# Patient Record
Sex: MALE | Race: WHITE | Employment: OTHER | URBAN - METROPOLITAN AREA
[De-identification: names, ages, dates, MRNs, and addresses within clinical notes are randomized per-mention and may not be internally consistent; named-entity substitution may affect disease eponyms.]

---

## 2017-11-01 ENCOUNTER — TELEPHONE (OUTPATIENT)
Dept: INTERNAL MEDICINE | Age: 57
End: 2017-11-01

## 2017-11-01 DIAGNOSIS — Z12.5 SCREENING PSA (PROSTATE SPECIFIC ANTIGEN): ICD-10-CM

## 2017-11-01 DIAGNOSIS — Z00.00 ANNUAL PHYSICAL EXAM: Primary | ICD-10-CM

## 2017-11-21 ENCOUNTER — OFFICE VISIT (OUTPATIENT)
Dept: INTERNAL MEDICINE | Age: 57
End: 2017-11-21

## 2017-11-21 VITALS
DIASTOLIC BLOOD PRESSURE: 78 MMHG | HEART RATE: 68 BPM | SYSTOLIC BLOOD PRESSURE: 126 MMHG | RESPIRATION RATE: 14 BRPM | WEIGHT: 160 LBS | BODY MASS INDEX: 19.89 KG/M2 | HEIGHT: 75 IN

## 2017-11-21 DIAGNOSIS — Z00.00 WELL ADULT EXAM: Primary | ICD-10-CM

## 2017-11-21 DIAGNOSIS — B00.1 RECURRENT COLD SORES: ICD-10-CM

## 2017-11-21 DIAGNOSIS — Z12.5 SCREENING PSA (PROSTATE SPECIFIC ANTIGEN): ICD-10-CM

## 2017-11-21 DIAGNOSIS — Z00.00 ANNUAL PHYSICAL EXAM: ICD-10-CM

## 2017-11-21 LAB
A/G RATIO: 2.2 (ref 1.1–2.2)
ALBUMIN SERPL-MCNC: 4.8 G/DL (ref 3.4–5)
ALP BLD-CCNC: 64 U/L (ref 40–129)
ALT SERPL-CCNC: 13 U/L (ref 10–40)
ANION GAP SERPL CALCULATED.3IONS-SCNC: 13 MMOL/L (ref 3–16)
AST SERPL-CCNC: 22 U/L (ref 15–37)
BASOPHILS ABSOLUTE: 0 K/UL (ref 0–0.2)
BASOPHILS RELATIVE PERCENT: 0.4 %
BILIRUB SERPL-MCNC: 0.5 MG/DL (ref 0–1)
BILIRUBIN URINE: NEGATIVE
BLOOD, URINE: ABNORMAL
BUN BLDV-MCNC: 18 MG/DL (ref 7–20)
CALCIUM SERPL-MCNC: 9.8 MG/DL (ref 8.3–10.6)
CHLORIDE BLD-SCNC: 99 MMOL/L (ref 99–110)
CHOLESTEROL, TOTAL: 207 MG/DL (ref 0–199)
CLARITY: CLEAR
CO2: 28 MMOL/L (ref 21–32)
COLOR: YELLOW
CREAT SERPL-MCNC: 0.8 MG/DL (ref 0.9–1.3)
EOSINOPHILS ABSOLUTE: 0.2 K/UL (ref 0–0.6)
EOSINOPHILS RELATIVE PERCENT: 2.4 %
EPITHELIAL CELLS, UA: 0 /HPF (ref 0–5)
GFR AFRICAN AMERICAN: >60
GFR NON-AFRICAN AMERICAN: >60
GLOBULIN: 2.2 G/DL
GLUCOSE BLD-MCNC: 95 MG/DL (ref 70–99)
GLUCOSE URINE: NEGATIVE MG/DL
HCT VFR BLD CALC: 48.9 % (ref 40.5–52.5)
HDLC SERPL-MCNC: 64 MG/DL (ref 40–60)
HEMOGLOBIN: 16.6 G/DL (ref 13.5–17.5)
HYALINE CASTS: 0 /LPF (ref 0–8)
KETONES, URINE: NEGATIVE MG/DL
LDL CHOLESTEROL CALCULATED: 127 MG/DL
LEUKOCYTE ESTERASE, URINE: NEGATIVE
LYMPHOCYTES ABSOLUTE: 2.1 K/UL (ref 1–5.1)
LYMPHOCYTES RELATIVE PERCENT: 24 %
MCH RBC QN AUTO: 34.1 PG (ref 26–34)
MCHC RBC AUTO-ENTMCNC: 33.9 G/DL (ref 31–36)
MCV RBC AUTO: 100.4 FL (ref 80–100)
MICROSCOPIC EXAMINATION: YES
MONOCYTES ABSOLUTE: 0.6 K/UL (ref 0–1.3)
MONOCYTES RELATIVE PERCENT: 6.4 %
NEUTROPHILS ABSOLUTE: 5.8 K/UL (ref 1.7–7.7)
NEUTROPHILS RELATIVE PERCENT: 66.8 %
NITRITE, URINE: NEGATIVE
PDW BLD-RTO: 13 % (ref 12.4–15.4)
PH UA: 6
PLATELET # BLD: 144 K/UL (ref 135–450)
PMV BLD AUTO: 8.7 FL (ref 5–10.5)
POTASSIUM SERPL-SCNC: 4.7 MMOL/L (ref 3.5–5.1)
PROSTATE SPECIFIC ANTIGEN: 1.11 NG/ML (ref 0–4)
PROTEIN UA: ABNORMAL MG/DL
RBC # BLD: 4.87 M/UL (ref 4.2–5.9)
RBC UA: 2 /HPF (ref 0–4)
SODIUM BLD-SCNC: 140 MMOL/L (ref 136–145)
SPECIFIC GRAVITY UA: 1.02
TOTAL PROTEIN: 7 G/DL (ref 6.4–8.2)
TRIGL SERPL-MCNC: 81 MG/DL (ref 0–150)
TSH SERPL DL<=0.05 MIU/L-ACNC: 1.45 UIU/ML (ref 0.27–4.2)
URINE TYPE: ABNORMAL
UROBILINOGEN, URINE: 0.2 E.U./DL
VLDLC SERPL CALC-MCNC: 16 MG/DL
WBC # BLD: 8.6 K/UL (ref 4–11)
WBC UA: 1 /HPF (ref 0–5)

## 2017-11-21 PROCEDURE — 99396 PREV VISIT EST AGE 40-64: CPT | Performed by: INTERNAL MEDICINE

## 2017-11-21 ASSESSMENT — ENCOUNTER SYMPTOMS
EYES NEGATIVE: 1
RESPIRATORY NEGATIVE: 1
GASTROINTESTINAL NEGATIVE: 1

## 2017-11-21 ASSESSMENT — PATIENT HEALTH QUESTIONNAIRE - PHQ9
2. FEELING DOWN, DEPRESSED OR HOPELESS: 0
1. LITTLE INTEREST OR PLEASURE IN DOING THINGS: 0
SUM OF ALL RESPONSES TO PHQ9 QUESTIONS 1 & 2: 0
SUM OF ALL RESPONSES TO PHQ QUESTIONS 1-9: 0

## 2017-11-21 NOTE — ASSESSMENT & PLAN NOTE
Within normal limits for age- cont to work no ADL issues,immunizations up to date refuses flu , no depression ,no cognitive impairment  Colonoscopy up to date  Eye exam up to date  Exercises as tolerated    No living will but does not want resuscitation   Findings and recommendations discussed with Pt

## 2017-11-22 ENCOUNTER — TELEPHONE (OUTPATIENT)
Dept: INTERNAL MEDICINE | Age: 57
End: 2017-11-22

## 2017-11-28 ENCOUNTER — NURSE ONLY (OUTPATIENT)
Dept: INTERNAL MEDICINE | Age: 57
End: 2017-11-28

## 2017-11-28 DIAGNOSIS — N39.0 URINARY TRACT INFECTION WITHOUT HEMATURIA, SITE UNSPECIFIED: Primary | ICD-10-CM

## 2017-11-28 LAB
BILIRUBIN, POC: NORMAL
BLOOD URINE, POC: NORMAL
CLARITY, POC: CLEAR
COLOR, POC: YELLOW
GLUCOSE URINE, POC: NORMAL
KETONES, POC: NORMAL
LEUKOCYTE EST, POC: NORMAL
NITRITE, POC: NORMAL
PH, POC: 5
PROTEIN, POC: NORMAL
SPECIFIC GRAVITY, POC: 1
UROBILINOGEN, POC: 0.2

## 2017-11-28 PROCEDURE — 81002 URINALYSIS NONAUTO W/O SCOPE: CPT | Performed by: INTERNAL MEDICINE

## 2017-11-30 LAB — URINE CULTURE, ROUTINE: NORMAL

## 2018-05-01 ENCOUNTER — OFFICE VISIT (OUTPATIENT)
Dept: INTERNAL MEDICINE | Age: 58
End: 2018-05-01

## 2018-05-01 VITALS
WEIGHT: 161.8 LBS | BODY MASS INDEX: 20.12 KG/M2 | DIASTOLIC BLOOD PRESSURE: 60 MMHG | HEIGHT: 75 IN | RESPIRATION RATE: 14 BRPM | HEART RATE: 66 BPM | SYSTOLIC BLOOD PRESSURE: 116 MMHG

## 2018-05-01 DIAGNOSIS — L30.9 DERMATITIS: ICD-10-CM

## 2018-05-01 PROCEDURE — 99213 OFFICE O/P EST LOW 20 MIN: CPT | Performed by: INTERNAL MEDICINE

## 2018-05-01 PROCEDURE — G8420 CALC BMI NORM PARAMETERS: HCPCS | Performed by: INTERNAL MEDICINE

## 2018-05-01 PROCEDURE — G8427 DOCREV CUR MEDS BY ELIG CLIN: HCPCS | Performed by: INTERNAL MEDICINE

## 2018-05-01 PROCEDURE — 1036F TOBACCO NON-USER: CPT | Performed by: INTERNAL MEDICINE

## 2018-05-01 PROCEDURE — 3017F COLORECTAL CA SCREEN DOC REV: CPT | Performed by: INTERNAL MEDICINE

## 2018-05-01 RX ORDER — CLOTRIMAZOLE AND BETAMETHASONE DIPROPIONATE 10; .64 MG/G; MG/G
CREAM TOPICAL
Qty: 15 G | Refills: 2 | Status: SHIPPED | OUTPATIENT
Start: 2018-05-01 | End: 2019-04-05

## 2018-05-01 ASSESSMENT — ENCOUNTER SYMPTOMS
RESPIRATORY NEGATIVE: 1
GASTROINTESTINAL NEGATIVE: 1
EYES NEGATIVE: 1

## 2018-06-12 ENCOUNTER — OFFICE VISIT (OUTPATIENT)
Dept: INTERNAL MEDICINE | Age: 58
End: 2018-06-12

## 2018-06-12 VITALS — BODY MASS INDEX: 19.87 KG/M2 | WEIGHT: 159 LBS

## 2018-06-12 DIAGNOSIS — M67.472 GANGLION CYST OF LEFT FOOT: Primary | ICD-10-CM

## 2018-06-12 PROCEDURE — G8428 CUR MEDS NOT DOCUMENT: HCPCS | Performed by: INTERNAL MEDICINE

## 2018-06-12 PROCEDURE — 1036F TOBACCO NON-USER: CPT | Performed by: INTERNAL MEDICINE

## 2018-06-12 PROCEDURE — 99213 OFFICE O/P EST LOW 20 MIN: CPT | Performed by: INTERNAL MEDICINE

## 2018-06-12 PROCEDURE — G8420 CALC BMI NORM PARAMETERS: HCPCS | Performed by: INTERNAL MEDICINE

## 2018-06-12 PROCEDURE — 3017F COLORECTAL CA SCREEN DOC REV: CPT | Performed by: INTERNAL MEDICINE

## 2018-06-12 ASSESSMENT — ENCOUNTER SYMPTOMS
BACK PAIN: 0
GASTROINTESTINAL NEGATIVE: 1
EYES NEGATIVE: 1
RESPIRATORY NEGATIVE: 1

## 2018-11-04 PROBLEM — D45 POLYCYTHEMIA VERA (HCC): Status: ACTIVE | Noted: 2018-11-04

## 2019-01-28 ENCOUNTER — OFFICE VISIT (OUTPATIENT)
Dept: ORTHOPEDIC SURGERY | Age: 59
End: 2019-01-28
Payer: COMMERCIAL

## 2019-01-28 DIAGNOSIS — M25.562 LEFT KNEE PAIN, UNSPECIFIED CHRONICITY: ICD-10-CM

## 2019-01-28 DIAGNOSIS — S83.282A TEAR OF LATERAL MENISCUS OF LEFT KNEE, CURRENT, UNSPECIFIED TEAR TYPE, INITIAL ENCOUNTER: Primary | ICD-10-CM

## 2019-01-28 PROCEDURE — 99203 OFFICE O/P NEW LOW 30 MIN: CPT | Performed by: INTERNAL MEDICINE

## 2019-02-04 ENCOUNTER — OFFICE VISIT (OUTPATIENT)
Dept: ORTHOPEDIC SURGERY | Age: 59
End: 2019-02-04
Payer: COMMERCIAL

## 2019-02-04 ENCOUNTER — TELEPHONE (OUTPATIENT)
Dept: ORTHOPEDIC SURGERY | Age: 59
End: 2019-02-04

## 2019-02-04 DIAGNOSIS — M22.42 CHONDROMALACIA OF LEFT PATELLA: Primary | ICD-10-CM

## 2019-02-04 DIAGNOSIS — S83.8X2D SPRAIN OF OTHER LIGAMENT OF LEFT KNEE, SUBSEQUENT ENCOUNTER: ICD-10-CM

## 2019-02-04 DIAGNOSIS — S76.319A HAMSTRING STRAIN, INITIAL ENCOUNTER: ICD-10-CM

## 2019-02-04 PROCEDURE — L1810 KO ELASTIC WITH JOINTS: HCPCS | Performed by: INTERNAL MEDICINE

## 2019-02-04 PROCEDURE — 99213 OFFICE O/P EST LOW 20 MIN: CPT | Performed by: INTERNAL MEDICINE

## 2019-04-05 ENCOUNTER — OFFICE VISIT (OUTPATIENT)
Dept: INTERNAL MEDICINE CLINIC | Age: 59
End: 2019-04-05
Payer: COMMERCIAL

## 2019-04-05 ENCOUNTER — HOSPITAL ENCOUNTER (OUTPATIENT)
Dept: CT IMAGING | Age: 59
Discharge: HOME | End: 2019-04-05
Payer: COMMERCIAL

## 2019-04-05 VITALS
DIASTOLIC BLOOD PRESSURE: 58 MMHG | HEIGHT: 75 IN | SYSTOLIC BLOOD PRESSURE: 100 MMHG | RESPIRATION RATE: 14 BRPM | BODY MASS INDEX: 20.14 KG/M2 | HEART RATE: 66 BPM | WEIGHT: 162 LBS

## 2019-04-05 DIAGNOSIS — R42 VERTIGO: Primary | ICD-10-CM

## 2019-04-05 DIAGNOSIS — R42 VERTIGO: ICD-10-CM

## 2019-04-05 DIAGNOSIS — Z12.11 SCREENING FOR COLON CANCER: ICD-10-CM

## 2019-04-05 DIAGNOSIS — J01.80 ACUTE NON-RECURRENT SINUSITIS OF OTHER SINUS: ICD-10-CM

## 2019-04-05 PROCEDURE — 99214 OFFICE O/P EST MOD 30 MIN: CPT | Performed by: INTERNAL MEDICINE

## 2019-04-05 PROCEDURE — 70450 CT HEAD/BRAIN W/O DYE: CPT

## 2019-04-05 RX ORDER — MECLIZINE HYDROCHLORIDE 25 MG/1
25 TABLET ORAL 3 TIMES DAILY PRN
Qty: 30 TABLET | Refills: 2 | Status: SHIPPED | OUTPATIENT
Start: 2019-04-05 | End: 2019-04-15

## 2019-04-05 RX ORDER — FLUTICASONE PROPIONATE 50 MCG
1 SPRAY, SUSPENSION (ML) NASAL DAILY
COMMUNITY
End: 2020-08-12 | Stop reason: ALTCHOICE

## 2019-04-05 ASSESSMENT — ENCOUNTER SYMPTOMS
VOMITING: 0
EYES NEGATIVE: 1
NAUSEA: 1
SINUS PRESSURE: 1
BACK PAIN: 0
RESPIRATORY NEGATIVE: 1

## 2019-04-05 ASSESSMENT — PATIENT HEALTH QUESTIONNAIRE - PHQ9
1. LITTLE INTEREST OR PLEASURE IN DOING THINGS: 0
SUM OF ALL RESPONSES TO PHQ QUESTIONS 1-9: 0
2. FEELING DOWN, DEPRESSED OR HOPELESS: 0
SUM OF ALL RESPONSES TO PHQ9 QUESTIONS 1 & 2: 0
SUM OF ALL RESPONSES TO PHQ QUESTIONS 1-9: 0

## 2019-04-05 NOTE — PROGRESS NOTES
Subjective:      Patient ID: Davi Riley is a 59 y.o. male. HPI  Here today for follow up of chronic problems as per HPI and as problems listed under assessment and plan, has had 4 day of vertigo worse in the AM also some pressure R max and frontal region no injury no travel no temp some Nasuea  no Vomiting       Allergies   Allergen Reactions    Pcn [Penicillins]      Since childhood       Current Outpatient Medications   Medication Sig Dispense Refill    Loratadine (CLARITIN PO) Take by mouth      fluticasone (FLONASE) 50 MCG/ACT nasal spray 1 spray by Each Nare route daily      meclizine (ANTIVERT) 25 MG tablet Take 1 tablet by mouth 3 times daily as needed for Dizziness 30 tablet 2     No current facility-administered medications for this visit. No past medical history on file. Family History   Problem Relation Age of Onset    Cancer Mother        No past surgical history on file.     Social History     Socioeconomic History    Marital status:      Spouse name: Not on file    Number of children: Not on file    Years of education: Not on file    Highest education level: Not on file   Occupational History    Not on file   Social Needs    Financial resource strain: Not on file    Food insecurity:     Worry: Not on file     Inability: Not on file    Transportation needs:     Medical: Not on file     Non-medical: Not on file   Tobacco Use    Smoking status: Never Smoker    Smokeless tobacco: Never Used   Substance and Sexual Activity    Alcohol use: Yes     Comment: occ    Drug use: Not on file    Sexual activity: Not on file   Lifestyle    Physical activity:     Days per week: Not on file     Minutes per session: Not on file    Stress: Not on file   Relationships    Social connections:     Talks on phone: Not on file     Gets together: Not on file     Attends Islam service: Not on file     Active member of club or organization: Not on file     Attends meetings of clubs or organizations: Not on file     Relationship status: Not on file    Intimate partner violence:     Fear of current or ex partner: Not on file     Emotionally abused: Not on file     Physically abused: Not on file     Forced sexual activity: Not on file   Other Topics Concern    Not on file   Social History Narrative    Not on file       Review of Systems  Review of Systems   Constitutional: Negative. Negative for chills, diaphoresis and fatigue. HENT: Positive for congestion and sinus pressure. Eyes: Negative. Respiratory: Negative. Cardiovascular: Negative. Gastrointestinal: Positive for nausea. Negative for vomiting. Genitourinary: Negative. Musculoskeletal: Negative. Negative for arthralgias, back pain, joint swelling and myalgias. Neurological: Positive for dizziness and light-headedness. Negative for seizures and speech difficulty. Objective:   Physical Exam:  Physical Exam   Constitutional: He is oriented to person, place, and time. He appears well-developed and well-nourished. HENT:   Head: Normocephalic. Right Ear: External ear normal.   Left Ear: External ear normal.   Nose: Nose normal.   Tender R max and frontal sinus region    Eyes: Pupils are equal, round, and reactive to light. EOM are normal.   Neck: Normal range of motion. Neck supple. Cardiovascular: Normal rate, regular rhythm and normal heart sounds. Pulmonary/Chest: Effort normal and breath sounds normal.   Abdominal: Soft. Bowel sounds are normal.   Musculoskeletal: Normal range of motion. Neurological: He is alert and oriented to person, place, and time. No sensory deficit. He exhibits normal muscle tone.  Coordination (mild vertigo with walking toe to toe ) abnormal.   Mild orthostatic symptoms        BP (!) 100/58 (Site: Right Upper Arm, Position: Sitting, Cuff Size: Medium Adult) Comment: mild vertigo  Pulse 66   Resp 14   Ht 6' 3\" (1.905 m)   Wt 162 lb (73.5 kg)   BMI 20.25 kg/m²       Assessment

## 2019-04-26 ENCOUNTER — PATIENT MESSAGE (OUTPATIENT)
Dept: INTERNAL MEDICINE CLINIC | Age: 59
End: 2019-04-26

## 2019-04-26 NOTE — TELEPHONE ENCOUNTER
From: Vivienne Ireland  To: Jose Adorno MD  Sent: 4/26/2019 2:56 PM EDT  Subject: Test Results Question    Hello Dr. Kavin Garner,    My insurance, [employer], has denied the CT scan that I received on April 5th. The letter I was sent indicated that you are aware of this action. I plan to file a written appeal of this decision and would like to designate you as my authorized representative. Is this the appropriate course of action? I appreciate an guidance you can provide.     Thank you  Vivienne Ireland

## 2020-08-12 ENCOUNTER — OFFICE VISIT (OUTPATIENT)
Dept: INTERNAL MEDICINE CLINIC | Age: 60
End: 2020-08-12
Payer: COMMERCIAL

## 2020-08-12 VITALS
HEIGHT: 75 IN | BODY MASS INDEX: 21.31 KG/M2 | RESPIRATION RATE: 16 BRPM | DIASTOLIC BLOOD PRESSURE: 68 MMHG | HEART RATE: 68 BPM | WEIGHT: 171.4 LBS | SYSTOLIC BLOOD PRESSURE: 110 MMHG | TEMPERATURE: 97.3 F

## 2020-08-12 PROBLEM — D22.9 CHANGE IN SKIN MOLE: Status: ACTIVE | Noted: 2020-08-12

## 2020-08-12 PROCEDURE — 99213 OFFICE O/P EST LOW 20 MIN: CPT | Performed by: INTERNAL MEDICINE

## 2020-08-12 ASSESSMENT — PATIENT HEALTH QUESTIONNAIRE - PHQ9
1. LITTLE INTEREST OR PLEASURE IN DOING THINGS: 0
SUM OF ALL RESPONSES TO PHQ QUESTIONS 1-9: 0
SUM OF ALL RESPONSES TO PHQ QUESTIONS 1-9: 0
2. FEELING DOWN, DEPRESSED OR HOPELESS: 0
SUM OF ALL RESPONSES TO PHQ9 QUESTIONS 1 & 2: 0

## 2020-08-12 ASSESSMENT — ENCOUNTER SYMPTOMS
RESPIRATORY NEGATIVE: 1
GASTROINTESTINAL NEGATIVE: 1

## 2020-08-12 NOTE — PROGRESS NOTES
Subjective:      Patient ID: Rachelle Bajwa is a 56 y.o. male. HPI   Has now retired will plan CPE soon has a change in mole on his back that he is concermned about ? Change in the last few days no injury no bleeding       Allergies   Allergen Reactions    Pcn [Penicillins]      Since childhood       No current outpatient medications on file. No current facility-administered medications for this visit. History reviewed. No pertinent past medical history. Family History   Problem Relation Age of Onset    Cancer Mother        History reviewed. No pertinent surgical history.     Social History     Socioeconomic History    Marital status:      Spouse name: Not on file    Number of children: Not on file    Years of education: Not on file    Highest education level: Not on file   Occupational History    Not on file   Social Needs    Financial resource strain: Not on file    Food insecurity     Worry: Not on file     Inability: Not on file    Transportation needs     Medical: Not on file     Non-medical: Not on file   Tobacco Use    Smoking status: Never Smoker    Smokeless tobacco: Never Used   Substance and Sexual Activity    Alcohol use: Yes     Comment: occ    Drug use: Not on file    Sexual activity: Not on file   Lifestyle    Physical activity     Days per week: Not on file     Minutes per session: Not on file    Stress: Not on file   Relationships    Social connections     Talks on phone: Not on file     Gets together: Not on file     Attends Jew service: Not on file     Active member of club or organization: Not on file     Attends meetings of clubs or organizations: Not on file     Relationship status: Not on file    Intimate partner violence     Fear of current or ex partner: Not on file     Emotionally abused: Not on file     Physically abused: Not on file     Forced sexual activity: Not on file   Other Topics Concern    Not on file   Social History Narrative    Not on file       Review of Systems  Review of Systems   Constitutional: Positive for unexpected weight change (up a few 3 ). Respiratory: Negative. Cardiovascular: Negative. Gastrointestinal: Negative. Skin:        Mult moles on back see HPI        Objective:   Physical Exam:  Physical Exam  HENT:      Head: Normocephalic and atraumatic. Cardiovascular:      Rate and Rhythm: Normal rate and regular rhythm. Pulses: Normal pulses. Heart sounds: Normal heart sounds. Pulmonary:      Effort: Pulmonary effort is normal.      Breath sounds: Normal breath sounds. Skin:     General: Skin is warm. Comments: Mult molse on back one with crusty changes    Neurological:      Mental Status: He is alert.          /68 (Site: Right Upper Arm, Position: Sitting, Cuff Size: Medium Adult)   Pulse 68   Temp 97.3 °F (36.3 °C) (Oral)   Resp 16   Ht 6' 3\" (1.905 m)   Wt 171 lb 6.4 oz (77.7 kg)   BMI 21.42 kg/m²       Assessment & Plan:         Change in skin mole  Mult moles on back one that is now crusty refer to [facility]

## 2022-03-21 ENCOUNTER — OFFICE VISIT (OUTPATIENT)
Dept: INTERNAL MEDICINE CLINIC | Age: 62
End: 2022-03-21
Payer: COMMERCIAL

## 2022-03-21 VITALS
DIASTOLIC BLOOD PRESSURE: 80 MMHG | WEIGHT: 169 LBS | OXYGEN SATURATION: 98 % | HEIGHT: 75 IN | HEART RATE: 85 BPM | BODY MASS INDEX: 21.01 KG/M2 | SYSTOLIC BLOOD PRESSURE: 126 MMHG

## 2022-03-21 DIAGNOSIS — K43.9 VENTRAL HERNIA WITHOUT OBSTRUCTION OR GANGRENE: Primary | ICD-10-CM

## 2022-03-21 PROCEDURE — 99213 OFFICE O/P EST LOW 20 MIN: CPT | Performed by: HOSPITALIST

## 2022-03-21 SDOH — ECONOMIC STABILITY: FOOD INSECURITY: WITHIN THE PAST 12 MONTHS, YOU WORRIED THAT YOUR FOOD WOULD RUN OUT BEFORE YOU GOT MONEY TO BUY MORE.: NEVER TRUE

## 2022-03-21 SDOH — ECONOMIC STABILITY: FOOD INSECURITY: WITHIN THE PAST 12 MONTHS, THE FOOD YOU BOUGHT JUST DIDN'T LAST AND YOU DIDN'T HAVE MONEY TO GET MORE.: NEVER TRUE

## 2022-03-21 ASSESSMENT — ENCOUNTER SYMPTOMS
BLOOD IN STOOL: 0
WHEEZING: 0
SHORTNESS OF BREATH: 0
SINUS PRESSURE: 0
TROUBLE SWALLOWING: 0
SINUS PAIN: 0
COUGH: 0
BACK PAIN: 0
SORE THROAT: 0
ABDOMINAL PAIN: 1
NAUSEA: 0
CONSTIPATION: 0
CHEST TIGHTNESS: 0
VOMITING: 0
ABDOMINAL DISTENTION: 0
VOICE CHANGE: 0
DIARRHEA: 0

## 2022-03-21 ASSESSMENT — PATIENT HEALTH QUESTIONNAIRE - PHQ9
SUM OF ALL RESPONSES TO PHQ QUESTIONS 1-9: 0
SUM OF ALL RESPONSES TO PHQ9 QUESTIONS 1 & 2: 0
2. FEELING DOWN, DEPRESSED OR HOPELESS: 0
1. LITTLE INTEREST OR PLEASURE IN DOING THINGS: 0
SUM OF ALL RESPONSES TO PHQ QUESTIONS 1-9: 0

## 2022-03-21 ASSESSMENT — SOCIAL DETERMINANTS OF HEALTH (SDOH): HOW HARD IS IT FOR YOU TO PAY FOR THE VERY BASICS LIKE FOOD, HOUSING, MEDICAL CARE, AND HEATING?: NOT HARD AT ALL

## 2022-03-21 NOTE — PROGRESS NOTES
[facility] Internal Medicine  Follow-up care visit   3/21/2022    Patient:  Lissette León                                               : 1960  Age: 59 y.o. MRN: [id number]  Date : 3/21/2022    Lissette León is a 59 y.o. male who presents for :      -Patient was doing an abdominal workout and had severe pain L abdomen. Has gotten better. Palpable \"lump. \"      Chief Complaint   Patient presents with    Abdominal Pain     near navel        Review of Systems   Constitutional: Negative for activity change, appetite change, chills, diaphoresis, fatigue, fever and unexpected weight change. HENT: Negative for sinus pressure, sinus pain, sore throat, trouble swallowing and voice change. Eyes: Negative for visual disturbance. Respiratory: Negative for cough, chest tightness, shortness of breath and wheezing. Cardiovascular: Negative for chest pain, palpitations and leg swelling. Gastrointestinal: Positive for abdominal pain (L hernia). Negative for abdominal distention, blood in stool, constipation, diarrhea, nausea and vomiting. Endocrine: Negative for polydipsia and polyphagia. Genitourinary: Negative for decreased urine volume, difficulty urinating, dysuria and urgency. Musculoskeletal: Negative for back pain, gait problem, joint swelling and myalgias. Neurological: Negative for dizziness, seizures, syncope, light-headedness and headaches. Psychiatric/Behavioral: Negative for agitation, behavioral problems, confusion and suicidal ideas. No past medical history on file. No past surgical history on file. No current outpatient medications on file. No current facility-administered medications for this visit. /80   Pulse 85   Ht 6' 3\" (1.905 m)   Wt 169 lb (76.7 kg)   SpO2 98%   BMI 21.12 kg/m²     Physical Exam   Physical Exam  Vitals reviewed. Constitutional:       General: He is not in acute distress. Appearance: Normal appearance.    HENT: Head: Normocephalic and atraumatic. Mouth/Throat:      Pharynx: Oropharynx is clear. Eyes:      Conjunctiva/sclera: Conjunctivae normal.      Pupils: Pupils are equal, round, and reactive to light. Cardiovascular:      Rate and Rhythm: Normal rate and regular rhythm. Pulses: Normal pulses. Heart sounds: Normal heart sounds. Pulmonary:      Effort: Pulmonary effort is normal. No respiratory distress. Breath sounds: Normal breath sounds. No wheezing or rales. Abdominal:      Palpations: Abdomen is soft. Tenderness: There is no abdominal tenderness. There is no rebound. Hernia: A hernia (L ventral.  Easily reducible.  ) is present. Musculoskeletal:         General: No signs of injury. Normal range of motion. Cervical back: Normal range of motion and neck supple. Skin:     General: Skin is warm and dry. Coloration: Skin is not jaundiced. Neurological:      General: No focal deficit present. Mental Status: He is alert and oriented to person, place, and time. Psychiatric:         Behavior: Behavior normal.         Thought Content: Thought content normal.         Judgment: Judgment normal.         Assessment/ plan:     1. Ventral hernia without obstruction or gangrene  -Mild sx. Easily reducible. - CT ABDOMEN PELVIS W WO CONTRAST Additional Contrast? Radiologist Recommendation; Future     Will f/u Dr. Blaise Karimi 4/4/2022.       Tommy Rivera MD

## 2022-03-29 ENCOUNTER — HOSPITAL ENCOUNTER (OUTPATIENT)
Dept: CT IMAGING | Age: 62
Discharge: HOME | End: 2022-03-29
Payer: COMMERCIAL

## 2022-03-29 DIAGNOSIS — K43.9 VENTRAL HERNIA WITHOUT OBSTRUCTION OR GANGRENE: ICD-10-CM

## 2022-03-29 LAB
GFR AFRICAN AMERICAN: >60
GFR NON-AFRICAN AMERICAN: >60
PERFORMED ON: NORMAL
POC CREATININE: 0.9 MG/DL (ref 0.8–1.3)
POC SAMPLE TYPE: NORMAL

## 2022-03-29 PROCEDURE — 82565 ASSAY OF CREATININE: CPT

## 2022-03-29 PROCEDURE — 6360000004 HC RX CONTRAST MEDICATION: Performed by: HOSPITALIST

## 2022-03-29 PROCEDURE — 74177 CT ABD & PELVIS W/CONTRAST: CPT

## 2022-03-29 RX ADMIN — IOPAMIDOL 80 ML: 755 INJECTION, SOLUTION INTRAVENOUS at 18:05

## 2022-03-29 RX ADMIN — IOHEXOL 50 ML: 240 INJECTION, SOLUTION INTRATHECAL; INTRAVASCULAR; INTRAVENOUS; ORAL at 18:05

## 2022-03-30 NOTE — RESULT ENCOUNTER NOTE
Please call and inform the patient:    -By CT Scan the abdominal wall is intact (no hernia). I believe your symptoms will resolve without any intervention.

## 2022-04-04 ENCOUNTER — OFFICE VISIT (OUTPATIENT)
Dept: INTERNAL MEDICINE CLINIC | Age: 62
End: 2022-04-04
Payer: COMMERCIAL

## 2022-04-04 VITALS
WEIGHT: 171.4 LBS | TEMPERATURE: 97.5 F | RESPIRATION RATE: 14 BRPM | DIASTOLIC BLOOD PRESSURE: 70 MMHG | BODY MASS INDEX: 21.31 KG/M2 | HEART RATE: 117 BPM | HEIGHT: 75 IN | SYSTOLIC BLOOD PRESSURE: 118 MMHG | OXYGEN SATURATION: 100 %

## 2022-04-04 DIAGNOSIS — R10.33 PERIUMBILICAL ABDOMINAL PAIN: ICD-10-CM

## 2022-04-04 DIAGNOSIS — Z12.11 COLON CANCER SCREENING: ICD-10-CM

## 2022-04-04 DIAGNOSIS — Z00.00 WELL ADULT EXAM: Primary | ICD-10-CM

## 2022-04-04 DIAGNOSIS — B00.1 RECURRENT COLD SORES: ICD-10-CM

## 2022-04-04 DIAGNOSIS — D22.9 CHANGE IN SKIN MOLE: ICD-10-CM

## 2022-04-04 PROBLEM — J01.80 OTHER ACUTE SINUSITIS: Status: RESOLVED | Noted: 2019-04-05 | Resolved: 2022-04-04

## 2022-04-04 PROBLEM — M67.472 GANGLION CYST OF LEFT FOOT: Status: RESOLVED | Noted: 2018-06-12 | Resolved: 2022-04-04

## 2022-04-04 PROBLEM — R42 VERTIGO: Status: RESOLVED | Noted: 2019-04-05 | Resolved: 2022-04-04

## 2022-04-04 PROBLEM — D45 POLYCYTHEMIA VERA (HCC): Status: RESOLVED | Noted: 2018-11-04 | Resolved: 2022-04-04

## 2022-04-04 PROBLEM — L30.9 DERMATITIS: Status: RESOLVED | Noted: 2018-05-01 | Resolved: 2022-04-04

## 2022-04-04 PROCEDURE — 99396 PREV VISIT EST AGE 40-64: CPT | Performed by: INTERNAL MEDICINE

## 2022-04-04 ASSESSMENT — ENCOUNTER SYMPTOMS
SINUS PAIN: 0
EYES NEGATIVE: 1
WHEEZING: 0
CONSTIPATION: 0
CHEST TIGHTNESS: 0
BACK PAIN: 0
VOMITING: 0
SORE THROAT: 0
TROUBLE SWALLOWING: 0
VOICE CHANGE: 0
ABDOMINAL DISTENTION: 0
ABDOMINAL PAIN: 1
NAUSEA: 0
BLOOD IN STOOL: 0
SHORTNESS OF BREATH: 0
COUGH: 0
RESPIRATORY NEGATIVE: 1
SINUS PRESSURE: 0
DIARRHEA: 0

## 2022-04-04 ASSESSMENT — PATIENT HEALTH QUESTIONNAIRE - PHQ9
1. LITTLE INTEREST OR PLEASURE IN DOING THINGS: 0
SUM OF ALL RESPONSES TO PHQ9 QUESTIONS 1 & 2: 0
SUM OF ALL RESPONSES TO PHQ QUESTIONS 1-9: 0
2. FEELING DOWN, DEPRESSED OR HOPELESS: 0
SUM OF ALL RESPONSES TO PHQ QUESTIONS 1-9: 0

## 2022-04-04 NOTE — ASSESSMENT & PLAN NOTE
Within normal limits for age-retired  no ADL issues,immunizations up to date, no depression ,no cognitive impairment  Colonoscopy needs to be done refer again   Eye exam up to date  Exercises as tolerated    Has  living will but does not want resuscitation   Findings and recommendations discussed with Pt   Labs pending

## 2022-04-04 NOTE — PROGRESS NOTES
Subjective:      Patient ID: Ame Alexis is a 59 y.o. male. HPI  Here today for complete physical and review of chronic problems as listed under assessment and plan,no new c/o feels good  hadrecent abd wall pain W/U for ventral hernia was negative       Allergies   Allergen Reactions    Pcn [Penicillins]      Since childhood       No current outpatient medications on file. No current facility-administered medications for this visit. History reviewed. No pertinent past medical history. Family History   Problem Relation Age of Onset    Cancer Mother        History reviewed. No pertinent surgical history. Social History     Socioeconomic History    Marital status:      Spouse name: Not on file    Number of children: Not on file    Years of education: Not on file    Highest education level: Not on file   Occupational History    Not on file   Tobacco Use    Smoking status: Never Smoker    Smokeless tobacco: Never Used   Substance and Sexual Activity    Alcohol use: Yes     Comment: occ    Drug use: Not on file    Sexual activity: Not on file   Other Topics Concern    Not on file   Social History Narrative    Not on file     Social Determinants of Health     Financial Resource Strain: Low Risk     Difficulty of Paying Living Expenses: Not hard at all   Food Insecurity: No Food Insecurity    Worried About Running Out of Food in the Last Year: Never true    [address] in the Last Year: Never true   Transportation Needs:     Lack of Transportation (Medical): Not on file    Lack of Transportation (Non-Medical):  Not on file   Physical Activity:     Days of Exercise per Week: Not on file    Minutes of Exercise per Session: Not on file   Stress:     Feeling of Stress : Not on file   Social Connections:     Frequency of Communication with Friends and Family: Not on file    Frequency of Social Gatherings with Friends and Family: Not on file    Attends Anabaptist Services: Not on file    Active Member of Clubs or Organizations: Not on file    Attends Club or Organization Meetings: Not on file    Marital Status: Not on file   Intimate Partner Violence:     Fear of Current or Ex-Partner: Not on file    Emotionally Abused: Not on file    Physically Abused: Not on file    Sexually Abused: Not on file   Housing Stability:     Unable to Pay for Housing in the Last Year: Not on file    Number of [place] in the Last Year: Not on file    Unstable Housing in the Last Year: Not on file       Review of Systems  Review of Systems   Constitutional: Negative. Negative for activity change, appetite change, chills, diaphoresis, fatigue, fever and unexpected weight change. HENT: Negative. Negative for sinus pressure, sinus pain, sore throat, trouble swallowing and voice change. Eyes: Negative. Negative for visual disturbance. Glasses   Respiratory: Negative. Negative for cough, chest tightness, shortness of breath and wheezing. Cardiovascular: Negative. Negative for chest pain, palpitations and leg swelling. Gastrointestinal: Positive for abdominal pain (recent abd wall pain had neg CT thus muscle strain ). Negative for abdominal distention, blood in stool, constipation, diarrhea, nausea and vomiting. Send for colonoscopy refer again    Endocrine: Negative. Negative for polydipsia and polyphagia. Genitourinary: Negative. Negative for decreased urine volume, difficulty urinating, dysuria and urgency. Musculoskeletal: Negative. Negative for back pain, gait problem, joint swelling and myalgias. Skin: Negative. Allergic/Immunologic: Positive for environmental allergies. Neurological: Negative. Negative for dizziness, seizures, syncope, light-headedness and headaches. Hematological: Negative. Psychiatric/Behavioral: Negative. Negative for agitation, behavioral problems, confusion and suicidal ideas.        Objective:   Physical Exam:  Physical Exam  Constitutional:       Appearance: He is well-developed. HENT:      Head: Normocephalic and atraumatic. Right Ear: Tympanic membrane, ear canal and external ear normal.      Left Ear: Tympanic membrane, ear canal and external ear normal.   Eyes:      Extraocular Movements: Extraocular movements intact. Conjunctiva/sclera: Conjunctivae normal.      Pupils: Pupils are equal, round, and reactive to light. Neck:      Thyroid: No thyromegaly. Trachea: No tracheal deviation. Cardiovascular:      Rate and Rhythm: Normal rate and regular rhythm. Pulses: Normal pulses. Heart sounds: Normal heart sounds. No murmur heard. Pulmonary:      Effort: Pulmonary effort is normal.      Breath sounds: Normal breath sounds. Abdominal:      General: Abdomen is flat. Bowel sounds are normal.      Palpations: Abdomen is soft. Genitourinary:     Penis: Normal.       Prostate: Normal.      Rectum: Normal. Guaiac result negative. Musculoskeletal:         General: Normal range of motion. Cervical back: Normal range of motion and neck supple. Skin:     General: Skin is warm and dry. Neurological:      General: No focal deficit present. Mental Status: He is alert and oriented to person, place, and time. Mental status is at baseline. Deep Tendon Reflexes: Reflexes are normal and symmetric.    Psychiatric:         Behavior: Behavior normal.         /70 (Site: Right Upper Arm, Position: Sitting, Cuff Size: Medium Adult)   Pulse 117   Temp 97.5 °F (36.4 °C) (Temporal)   Resp 14   Ht 6' 3\" (1.905 m)   Wt 171 lb 6.4 oz (77.7 kg)   SpO2 100%   BMI 21.42 kg/m²       Assessment & Plan:          Recurrent cold sores       Periumbilical abdominal pain  R side 2nd to exercising had neg CT no ventral hernia is getting better     Change in skin mole   Refer to derm clinic for skin evaluation     Well adult exam    Within normal limits for age-retired  no ADL issues,immunizations up to date, no depression ,no cognitive impairment  Colonoscopy needs to be done refer again   Eye exam up to date  Exercises as tolerated    Has  living will but does not want resuscitation   Findings and recommendations discussed with Pt   Labs pending

## 2023-10-09 ENCOUNTER — TELEPHONE (OUTPATIENT)
Dept: INTERNAL MEDICINE CLINIC | Age: 63
End: 2023-10-09

## 2023-10-09 NOTE — TELEPHONE ENCOUNTER
----- Message from Criselda Standard sent at 10/9/2023  8:57 AM EDT -----  Subject: Appointment Request    Reason for Call: Established Patient Appointment needed: Urgent Cough Cold    QUESTIONS    Reason for appointment request? Available appointments did not meet   patient need     Additional Information for Provider? pt tested positive for Covid this   morning- has fever chills cough congestion- wants to start on meds to   relieve symptoms asap.  please call pt if meds can be called into pharmacy   or if appt is needed  ---------------------------------------------------------------------------  --------------  Alexey Marine Jeff  [phone]; OK to leave message on voicemail  ---------------------------------------------------------------------------  --------------  SCRIPT ANSWERS

## 2023-10-09 NOTE — TELEPHONE ENCOUNTER
Patient started feeling bad on Saturday 10/07/23. Tightness in chest, chills, fever, nasal congestion, sore throat. Fever is lower today! COVID POSITIVE; REQUESTING PAXLOVID.     [facility] [address] [facility], [address] [phone] - F [phone]

## 2023-10-10 ENCOUNTER — OFFICE VISIT (OUTPATIENT)
Dept: URGENT CARE | Age: 63
End: 2023-10-10

## 2023-10-10 DIAGNOSIS — Z00.00 PREVENTATIVE HEALTH CARE: Primary | ICD-10-CM

## 2023-10-10 DIAGNOSIS — U07.1 COVID-19: Primary | ICD-10-CM

## 2023-10-10 NOTE — TELEPHONE ENCOUNTER
I ordered Paxlovid. 2.  Patient needs to schedule a new patient visit with me. (I've seen him once in 2022 for an acute visit when he was [facility] patient). December would be ok for this. I have ordered lab work for him to do prior to the visit. He does not seem to have done labs since 2017 and it will be hard for me to prescribe him any medications in the future without a visit and Serina Bras being done once he gets over this.

## 2023-10-10 NOTE — PROGRESS NOTES
Berta Malloy (:  1960) is a 70 y.o. male,New patient, here for evaluation of the following chief complaint(s):  Covid Testing      ASSESSMENT/PLAN:  There are no diagnoses linked to this encounter. No follow-ups on file. SUBJECTIVE/OBJECTIVE:  Patient left without being seen           There were no vitals filed for this visit. Review of Systems    Physical Exam      An electronic signature was used to authenticate this note.     --Bharat Willis, NICHOLAS - CNP

## 2023-10-11 ENCOUNTER — TELEPHONE (OUTPATIENT)
Dept: INTERNAL MEDICINE CLINIC | Age: 63
End: 2023-10-11

## 2023-10-11 NOTE — TELEPHONE ENCOUNTER
I called pt to let him know that he has an rx sent to his pharmacy and that he needs to schedule a new pt appt with Dr Edilson Kenney and get labs drawn before visit

## 2023-10-11 NOTE — TELEPHONE ENCOUNTER
I called pt to let him know that he has an rx sent to his pharmacy and that he needs to schedule a new pt appt with Dr Glynn Terrell and get labs drawn before visit

## 2023-12-08 DIAGNOSIS — Z00.00 PREVENTATIVE HEALTH CARE: ICD-10-CM

## 2023-12-08 LAB
DEPRECATED RDW RBC AUTO: 13.2 % (ref 12.4–15.4)
HCT VFR BLD AUTO: 47.2 % (ref 40.5–52.5)
HGB BLD-MCNC: 15.8 G/DL (ref 13.5–17.5)
MCH RBC QN AUTO: 33.2 PG (ref 26–34)
MCHC RBC AUTO-ENTMCNC: 33.6 G/DL (ref 31–36)
MCV RBC AUTO: 98.9 FL (ref 80–100)
PLATELET # BLD AUTO: 162 K/UL (ref 135–450)
PMV BLD AUTO: 8.3 FL (ref 5–10.5)
PSA SERPL DL<=0.01 NG/ML-MCNC: 1.33 NG/ML (ref 0–4)
RBC # BLD AUTO: 4.77 M/UL (ref 4.2–5.9)
TSH SERPL DL<=0.005 MIU/L-ACNC: 2.66 UIU/ML (ref 0.27–4.2)
WBC # BLD AUTO: 7.3 K/UL (ref 4–11)

## 2023-12-09 LAB
ALBUMIN SERPL-MCNC: 4.8 G/DL (ref 3.4–5)
ALBUMIN/GLOB SERPL: 2.3 {RATIO} (ref 1.1–2.2)
ALP SERPL-CCNC: 72 U/L (ref 40–129)
ALT SERPL-CCNC: 10 U/L (ref 10–40)
ANION GAP SERPL CALCULATED.3IONS-SCNC: 10 MMOL/L (ref 3–16)
AST SERPL-CCNC: 21 U/L (ref 15–37)
BILIRUB SERPL-MCNC: 0.4 MG/DL (ref 0–1)
BUN SERPL-MCNC: 20 MG/DL (ref 7–20)
CALCIUM SERPL-MCNC: 9.4 MG/DL (ref 8.3–10.6)
CHLORIDE SERPL-SCNC: 103 MMOL/L (ref 99–110)
CHOLEST SERPL-MCNC: 206 MG/DL (ref 0–199)
CO2 SERPL-SCNC: 28 MMOL/L (ref 21–32)
CREAT SERPL-MCNC: 1 MG/DL (ref 0.8–1.3)
EST. AVERAGE GLUCOSE BLD GHB EST-MCNC: 99.7 MG/DL
GFR SERPLBLD CREATININE-BSD FMLA CKD-EPI: >60 ML/MIN/{1.73_M2}
GLUCOSE SERPL-MCNC: 103 MG/DL (ref 70–99)
HBA1C MFR BLD: 5.1 %
HDLC SERPL-MCNC: 53 MG/DL (ref 40–60)
LDL CHOLESTEROL CALCULATED: 132 MG/DL
POTASSIUM SERPL-SCNC: 4.5 MMOL/L (ref 3.5–5.1)
PROT SERPL-MCNC: 6.9 G/DL (ref 6.4–8.2)
SODIUM SERPL-SCNC: 141 MMOL/L (ref 136–145)
TRIGL SERPL-MCNC: 105 MG/DL (ref 0–150)
VLDLC SERPL CALC-MCNC: 21 MG/DL

## 2023-12-11 SDOH — ECONOMIC STABILITY: INCOME INSECURITY: HOW HARD IS IT FOR YOU TO PAY FOR THE VERY BASICS LIKE FOOD, HOUSING, MEDICAL CARE, AND HEATING?: NOT HARD AT ALL

## 2023-12-11 SDOH — ECONOMIC STABILITY: FOOD INSECURITY: WITHIN THE PAST 12 MONTHS, YOU WORRIED THAT YOUR FOOD WOULD RUN OUT BEFORE YOU GOT MONEY TO BUY MORE.: NEVER TRUE

## 2023-12-11 SDOH — ECONOMIC STABILITY: HOUSING INSECURITY
IN THE LAST 12 MONTHS, WAS THERE A TIME WHEN YOU DID NOT HAVE A STEADY PLACE TO SLEEP OR SLEPT IN A SHELTER (INCLUDING NOW)?: NO

## 2023-12-11 SDOH — ECONOMIC STABILITY: FOOD INSECURITY: WITHIN THE PAST 12 MONTHS, THE FOOD YOU BOUGHT JUST DIDN'T LAST AND YOU DIDN'T HAVE MONEY TO GET MORE.: NEVER TRUE

## 2023-12-11 SDOH — ECONOMIC STABILITY: TRANSPORTATION INSECURITY
IN THE PAST 12 MONTHS, HAS LACK OF TRANSPORTATION KEPT YOU FROM MEETINGS, WORK, OR FROM GETTING THINGS NEEDED FOR DAILY LIVING?: NO

## 2023-12-11 ASSESSMENT — PATIENT HEALTH QUESTIONNAIRE - PHQ9
SUM OF ALL RESPONSES TO PHQ QUESTIONS 1-9: 0
2. FEELING DOWN, DEPRESSED OR HOPELESS: NOT AT ALL
1. LITTLE INTEREST OR PLEASURE IN DOING THINGS: NOT AT ALL
2. FEELING DOWN, DEPRESSED OR HOPELESS: 0
SUM OF ALL RESPONSES TO PHQ QUESTIONS 1-9: 0
1. LITTLE INTEREST OR PLEASURE IN DOING THINGS: 0
SUM OF ALL RESPONSES TO PHQ9 QUESTIONS 1 & 2: 0
SUM OF ALL RESPONSES TO PHQ9 QUESTIONS 1 & 2: 0

## 2023-12-13 ENCOUNTER — OFFICE VISIT (OUTPATIENT)
Dept: INTERNAL MEDICINE CLINIC | Age: 63
End: 2023-12-13
Payer: MEDICARE

## 2023-12-13 VITALS
SYSTOLIC BLOOD PRESSURE: 128 MMHG | WEIGHT: 169 LBS | HEIGHT: 75 IN | BODY MASS INDEX: 21.01 KG/M2 | DIASTOLIC BLOOD PRESSURE: 64 MMHG

## 2023-12-13 DIAGNOSIS — E78.5 HYPERLIPIDEMIA, UNSPECIFIED HYPERLIPIDEMIA TYPE: ICD-10-CM

## 2023-12-13 DIAGNOSIS — U09.9 LONG COVID: ICD-10-CM

## 2023-12-13 DIAGNOSIS — Z23 NEED FOR PNEUMOCOCCAL 20-VALENT CONJUGATE VACCINATION: ICD-10-CM

## 2023-12-13 DIAGNOSIS — Z23 NEED FOR INFLUENZA VACCINATION: ICD-10-CM

## 2023-12-13 DIAGNOSIS — Z00.00 WELCOME TO MEDICARE PREVENTIVE VISIT: Primary | ICD-10-CM

## 2023-12-13 DIAGNOSIS — Z12.12 ENCOUNTER FOR COLORECTAL CANCER SCREENING: ICD-10-CM

## 2023-12-13 DIAGNOSIS — Z12.11 ENCOUNTER FOR COLORECTAL CANCER SCREENING: ICD-10-CM

## 2023-12-13 PROCEDURE — G8484 FLU IMMUNIZE NO ADMIN: HCPCS | Performed by: HOSPITALIST

## 2023-12-13 PROCEDURE — 90694 VACC AIIV4 NO PRSRV 0.5ML IM: CPT | Performed by: HOSPITALIST

## 2023-12-13 PROCEDURE — 1036F TOBACCO NON-USER: CPT | Performed by: HOSPITALIST

## 2023-12-13 PROCEDURE — 90677 PCV20 VACCINE IM: CPT | Performed by: HOSPITALIST

## 2023-12-13 PROCEDURE — 99214 OFFICE O/P EST MOD 30 MIN: CPT | Performed by: HOSPITALIST

## 2023-12-13 PROCEDURE — G0402 INITIAL PREVENTIVE EXAM: HCPCS | Performed by: HOSPITALIST

## 2023-12-13 PROCEDURE — 1123F ACP DISCUSS/DSCN MKR DOCD: CPT | Performed by: HOSPITALIST

## 2023-12-13 PROCEDURE — G8420 CALC BMI NORM PARAMETERS: HCPCS | Performed by: HOSPITALIST

## 2023-12-13 PROCEDURE — G0008 ADMIN INFLUENZA VIRUS VAC: HCPCS | Performed by: HOSPITALIST

## 2023-12-13 PROCEDURE — 3017F COLORECTAL CA SCREEN DOC REV: CPT | Performed by: HOSPITALIST

## 2023-12-13 PROCEDURE — G8427 DOCREV CUR MEDS BY ELIG CLIN: HCPCS | Performed by: HOSPITALIST

## 2023-12-13 PROCEDURE — G0009 ADMIN PNEUMOCOCCAL VACCINE: HCPCS | Performed by: HOSPITALIST

## 2023-12-13 ASSESSMENT — PATIENT HEALTH QUESTIONNAIRE - PHQ9
SUM OF ALL RESPONSES TO PHQ QUESTIONS 1-9: 0
1. LITTLE INTEREST OR PLEASURE IN DOING THINGS: 0
SUM OF ALL RESPONSES TO PHQ QUESTIONS 1-9: 0
SUM OF ALL RESPONSES TO PHQ9 QUESTIONS 1 & 2: 0
2. FEELING DOWN, DEPRESSED OR HOPELESS: 0
SUM OF ALL RESPONSES TO PHQ QUESTIONS 1-9: 0
SUM OF ALL RESPONSES TO PHQ QUESTIONS 1-9: 0

## 2023-12-13 ASSESSMENT — LIFESTYLE VARIABLES
HOW OFTEN DO YOU HAVE A DRINK CONTAINING ALCOHOL: MONTHLY OR LESS
HOW MANY STANDARD DRINKS CONTAINING ALCOHOL DO YOU HAVE ON A TYPICAL DAY: 1 OR 2

## 2023-12-13 NOTE — PROGRESS NOTES
Medicare Annual Wellness Visit    Nancy Newell is here for Medicare AWV    Assessment & Plan   Welcome to Medicare preventive visit  Long COVID  Hyperlipidemia, unspecified hyperlipidemia type  -     Lipid, Fasting; Future  -     Comprehensive Metabolic Panel; Future  Encounter for colorectal cancer screening  -     Fecal DNA Colorectal cancer screening (Cologuard)    Recommendations for Preventive Services Due: see orders and patient instructions/AVS.  Recommended screening schedule for the next 5-10 years is provided to the patient in written form: see Patient Instructions/AVS.     No follow-ups on file. Subjective   The following acute and/or chronic problems were also addressed today:    Long Covid:  The patient was rated for COVID-19 infection in October 2023. He had problems with persistent cough, which is now resolved, however he still has severe fatigue and joint pains which are most prominent in the mornings. He reports no neurologic or mood disorder type symptoms. I independently interpreted and reviewed his lab work of 12/8/2023 which was overall normal.  He had normal blood counts, kidney function liver function, protein levels, TSH, and hemoglobin A1c which was also normal.  I offered to order inflammatory marker markers, and if these were very high perhaps send him to a rheumatologist.  At this point we are going to hold off. He is going to try to increase his exercise and activity level and see if this helps both with the joint pain and the fatigue. Hyperlipidemia: This is a chronic problem. The problem is reasonably stable. He has mild to moderate elevation of LDL-C. He has no family history of coronary artery disease. Risk factors for coronary artery disease include male gender and age. We discussed CT calcium scoring today. The patient feels like we can defer this.   He is going to try to make some lifestyle changes including moderation of saturated fat intake and increasing

## 2024-01-07 DIAGNOSIS — R19.5 POSITIVE COLORECTAL CANCER SCREENING USING COLOGUARD TEST: Primary | ICD-10-CM

## 2024-01-07 LAB — NONINV COLON CA DNA+OCC BLD SCRN STL QL: POSITIVE

## 2024-01-09 ENCOUNTER — TELEPHONE (OUTPATIENT)
Dept: INTERNAL MEDICINE CLINIC | Age: 64
End: 2024-01-09

## 2024-01-09 NOTE — TELEPHONE ENCOUNTER
----- Message from Karlamanuel Balderrama sent at 1/8/2024 12:28 PM EST -----  Subject: Message to Provider    QUESTIONS  Information for Provider? pt calling inquiring on how he should schedule   his colonoscopy. Please follow up.  ---------------------------------------------------------------------------  --------------  CALL BACK INFO  [phone]; OK to leave message on voicemail  ---------------------------------------------------------------------------  --------------  SCRIPT ANSWERS  Relationship to Patient? Self